# Patient Record
Sex: FEMALE | Race: WHITE | URBAN - METROPOLITAN AREA
[De-identification: names, ages, dates, MRNs, and addresses within clinical notes are randomized per-mention and may not be internally consistent; named-entity substitution may affect disease eponyms.]

---

## 2022-03-15 ENCOUNTER — TELEPHONE ENCOUNTER (OUTPATIENT)
Dept: URBAN - METROPOLITAN AREA CLINIC 36 | Facility: CLINIC | Age: 37
End: 2022-03-15

## 2022-04-25 ENCOUNTER — TELEPHONE ENCOUNTER (OUTPATIENT)
Dept: URBAN - METROPOLITAN AREA CLINIC 92 | Facility: CLINIC | Age: 37
End: 2022-04-25

## 2022-04-26 ENCOUNTER — WEB ENCOUNTER (OUTPATIENT)
Dept: URBAN - METROPOLITAN AREA CLINIC 23 | Facility: CLINIC | Age: 37
End: 2022-04-26

## 2022-04-28 ENCOUNTER — OFFICE VISIT (OUTPATIENT)
Dept: URBAN - METROPOLITAN AREA CLINIC 33 | Facility: CLINIC | Age: 37
End: 2022-04-28

## 2022-04-29 ENCOUNTER — OFFICE VISIT (OUTPATIENT)
Dept: URBAN - METROPOLITAN AREA CLINIC 23 | Facility: CLINIC | Age: 37
End: 2022-04-29
Payer: COMMERCIAL

## 2022-04-29 ENCOUNTER — WEB ENCOUNTER (OUTPATIENT)
Dept: URBAN - METROPOLITAN AREA CLINIC 23 | Facility: CLINIC | Age: 37
End: 2022-04-29

## 2022-04-29 ENCOUNTER — DASHBOARD ENCOUNTERS (OUTPATIENT)
Age: 37
End: 2022-04-29

## 2022-04-29 DIAGNOSIS — Z72.89 ALCOHOL USE: ICD-10-CM

## 2022-04-29 DIAGNOSIS — R79.89 ABNORMAL LFTS: ICD-10-CM

## 2022-04-29 DIAGNOSIS — K76.0 FATTY LIVER: ICD-10-CM

## 2022-04-29 DIAGNOSIS — R76.8 HEPATITIS C ANTIBODY TEST POSITIVE: ICD-10-CM

## 2022-04-29 PROCEDURE — 99204 OFFICE O/P NEW MOD 45 MIN: CPT | Performed by: INTERNAL MEDICINE

## 2022-04-29 NOTE — HPI-TODAY'S VISIT:
37-year-old female presented today for evaluation for elevated liver enzyme.  Patient reports she had elevated liver enzymes since October 2020 it was fluctuating less than 100 most recent liver enzyme at  office revealed  ALT 80.  She had abdominal ultrasound in November 2021 revealed fatty liver.  She drinks alcohol daily to a glass of wine every day for the last 5 years.  She had a previous lab in October 2021 showed hepatitis C antibody positive but PCR was negative at that time.  She also was diagnosed with bilateral uveitis she had full autoimmune serology recently all test were unremarkable her celiac disease serology all the way to normal no nausea no vomiting no abdominal pain her father has fatty liver.  No new medication no history of blood transfusion.

## 2022-05-01 PROBLEM — 197321007: Status: ACTIVE | Noted: 2022-04-29

## 2022-05-02 LAB
ALBUMIN: 4.5
ALKALINE PHOSPHATASE: 58
ALT (SGPT): 73
AST (SGOT): 99
BILIRUBIN, DIRECT: 0.17
BILIRUBIN, TOTAL: 0.5
CERULOPLASMIN: 26.3
HBSAG SCREEN: NEGATIVE
HCV LOG10: (no result)
HEP B SURFACE AB, QUAL: NON REACTIVE
HEP C VIRUS AB: 0.2
HEPATITIS C GENOTYPE: (no result)
HEPATITIS C QUANTITATION: (no result)
Lab: (no result)
PROTEIN, TOTAL: 7.4
TEST INFORMATION:: (no result)

## 2022-05-03 ENCOUNTER — TELEPHONE ENCOUNTER (OUTPATIENT)
Dept: URBAN - METROPOLITAN AREA CLINIC 23 | Facility: CLINIC | Age: 37
End: 2022-05-03

## 2022-05-13 ENCOUNTER — OFFICE VISIT (OUTPATIENT)
Dept: URBAN - METROPOLITAN AREA CLINIC 33 | Facility: CLINIC | Age: 37
End: 2022-05-13

## 2024-10-09 ENCOUNTER — OFFICE VISIT (OUTPATIENT)
Dept: URBAN - METROPOLITAN AREA CLINIC 23 | Facility: CLINIC | Age: 39
End: 2024-10-09

## 2024-10-11 ENCOUNTER — OFFICE VISIT (OUTPATIENT)
Dept: URBAN - METROPOLITAN AREA CLINIC 23 | Facility: CLINIC | Age: 39
End: 2024-10-11

## 2024-10-15 ENCOUNTER — OFFICE VISIT (OUTPATIENT)
Dept: URBAN - METROPOLITAN AREA CLINIC 23 | Facility: CLINIC | Age: 39
End: 2024-10-15
Payer: COMMERCIAL

## 2024-10-15 ENCOUNTER — LAB OUTSIDE AN ENCOUNTER (OUTPATIENT)
Dept: URBAN - METROPOLITAN AREA CLINIC 23 | Facility: CLINIC | Age: 39
End: 2024-10-15

## 2024-10-15 VITALS
TEMPERATURE: 97.5 F | HEART RATE: 80 BPM | WEIGHT: 136 LBS | SYSTOLIC BLOOD PRESSURE: 140 MMHG | DIASTOLIC BLOOD PRESSURE: 93 MMHG | BODY MASS INDEX: 23.22 KG/M2 | HEIGHT: 64 IN

## 2024-10-15 DIAGNOSIS — K76.0 FATTY LIVER: ICD-10-CM

## 2024-10-15 DIAGNOSIS — Z72.89 ALCOHOL USE: ICD-10-CM

## 2024-10-15 DIAGNOSIS — K62.5 PAINLESS RECTAL BLEEDING: ICD-10-CM

## 2024-10-15 DIAGNOSIS — R79.89 ABNORMAL LFTS: ICD-10-CM

## 2024-10-15 DIAGNOSIS — K64.8 INTERNAL HEMORRHOIDS: ICD-10-CM

## 2024-10-15 PROCEDURE — 99204 OFFICE O/P NEW MOD 45 MIN: CPT

## 2024-10-15 NOTE — PREVIOUS WORKUP REVIEWED EXTERNAL MEDICAL RECORD
.ENDOSCOPIESLABS Labs:06/2024: Hgb 13.7, HCT 41.9, , Platelets 260, T Bili 0.9, ALP 59, AST 54, ALT 4302/21/2024: Hgb 15, HCT 44.3, , Platelets 281, T Bili 0.6, ALP 68, AST 57, ALT 4202/06/2024:Hgb 14.6, HCT 43.8, , Platelets 299, T Bili 0.4, ALP 59, , ALT [id number] US showed fatty liver

## 2024-10-15 NOTE — HPI-TODAY'S VISIT:
39 yr old female here for blood in stool.    She was last seen May 2022 by Dr. Hodges for elevated liver enzymes most likely 2/2 alcohol consumption since AST to ALT ratio 2-1. Was drinking glass of wine daily x 5 years. If LFTs remained elevated despite alcohol cessation, recommend liver biopsy.  She is occassionally still drinking alcohol. 1-2 glasses of wine friday and/or saturday. most recent LFTs June still midly elevated. No HE, fatigue, dark urine. Also reports intermittent painless BRBPR on toilet paper and in bowl occuring once a month. uses flushable wipes. No melena.  No family hx of colon cancer. No abd pain or unintentional wt loss. on average, 1-3 BM daily, loose. No straining. No rectal pain/pressure or itching.